# Patient Record
Sex: MALE | Race: WHITE | NOT HISPANIC OR LATINO | Employment: STUDENT | URBAN - METROPOLITAN AREA
[De-identification: names, ages, dates, MRNs, and addresses within clinical notes are randomized per-mention and may not be internally consistent; named-entity substitution may affect disease eponyms.]

---
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
## 2021-10-31 NOTE — LETTER
February 6, 2020      [facility] - Neurology  [address]  [facility] [address]  Phone: [phone]  Fax: [phone]       Patient: Derek Zuniga   YOB: 2000  Date of Visit: 02/06/2020    To Whom It May Concern:    Ghassan Zuniga  was at [facility] on 02/06/2020. He was seen by me today for his migraines which he was given and will be taking Propranolol 60mg once daily and Rizatriptan 10mg as needed. His migraines have improved on this regimen. He will see me again in approximately 3 months when we will re-assess these medications and make adjustments as needed. If you have any questions or concerns, or if I can be of further assistance, please do not hesitate to contact me.    Sincerely,    Porsche Gunter PA-C     
  February 6, 2020      [facility] - Neurology  [address]  [place] [address]  Phone: [phone]  Fax: [phone]       Patient: Derek Zuniga   YOB: 2000  Date of Visit: 02/06/2020    To Whom It May Concern:    Deerk Zuniga  was at [facility] on 02/06/2020. He may return to work with no restrictions. If you have any questions or concerns, or if I can be of further assistance, please do not hesitate to contact me.    Sincerely,    Porsche Gnuter PA-C     
  February 6, 2020      [facility] - Neurology  [address]  [facility] [address]  Phone: [phone]  Fax: [phone]       Patient: Derek Zuniga   YOB: 2000  Date of Visit: 02/06/2020    To Whom It May Concern:    Ghassan Zuniga  was at [facility] on 02/06/2020. He was seen by me today for his migraines which he was given and will be taking Propranolol 60mg once daily and Diclofenac 50mg as needed. His migraines have improved on this regimen. He will see me again in approximately 3 months when we will re-assess these medications and make adjustments as needed. If you have any questions or concerns, or if I can be of further assistance, please do not hesitate to contact me.    Sincerely,    Porsche Gunter PA-C     
Negative